# Patient Record
Sex: MALE | Race: WHITE | NOT HISPANIC OR LATINO | URBAN - METROPOLITAN AREA
[De-identification: names, ages, dates, MRNs, and addresses within clinical notes are randomized per-mention and may not be internally consistent; named-entity substitution may affect disease eponyms.]

---

## 2017-03-20 ENCOUNTER — OUTPATIENT (OUTPATIENT)
Dept: OUTPATIENT SERVICES | Facility: HOSPITAL | Age: 42
LOS: 1 days | Discharge: HOME | End: 2017-03-20

## 2017-06-27 DIAGNOSIS — J18.9 PNEUMONIA, UNSPECIFIED ORGANISM: ICD-10-CM

## 2019-04-30 ENCOUNTER — OUTPATIENT (OUTPATIENT)
Dept: OUTPATIENT SERVICES | Facility: HOSPITAL | Age: 44
LOS: 1 days | Discharge: HOME | End: 2019-04-30

## 2019-04-30 DIAGNOSIS — Z00.00 ENCOUNTER FOR GENERAL ADULT MEDICAL EXAMINATION WITHOUT ABNORMAL FINDINGS: ICD-10-CM

## 2020-05-06 ENCOUNTER — TRANSCRIPTION ENCOUNTER (OUTPATIENT)
Age: 45
End: 2020-05-06

## 2021-01-21 ENCOUNTER — INPATIENT (INPATIENT)
Facility: HOSPITAL | Age: 46
LOS: 1 days | Discharge: HOME | End: 2021-01-23
Attending: NEUROLOGICAL SURGERY | Admitting: NEUROLOGICAL SURGERY
Payer: COMMERCIAL

## 2021-01-21 VITALS
HEART RATE: 95 BPM | SYSTOLIC BLOOD PRESSURE: 159 MMHG | HEIGHT: 73 IN | DIASTOLIC BLOOD PRESSURE: 91 MMHG | WEIGHT: 240.08 LBS | TEMPERATURE: 98 F | OXYGEN SATURATION: 97 % | RESPIRATION RATE: 19 BRPM

## 2021-01-21 DIAGNOSIS — Z98.890 OTHER SPECIFIED POSTPROCEDURAL STATES: Chronic | ICD-10-CM

## 2021-01-21 LAB
ALBUMIN SERPL ELPH-MCNC: 4.6 G/DL — SIGNIFICANT CHANGE UP (ref 3.5–5.2)
ALP SERPL-CCNC: 122 U/L — HIGH (ref 30–115)
ALT FLD-CCNC: 26 U/L — SIGNIFICANT CHANGE UP (ref 0–41)
ANION GAP SERPL CALC-SCNC: 13 MMOL/L — SIGNIFICANT CHANGE UP (ref 7–14)
APTT BLD: 39.6 SEC — HIGH (ref 27–39.2)
AST SERPL-CCNC: 18 U/L — SIGNIFICANT CHANGE UP (ref 0–41)
BASOPHILS # BLD AUTO: 0.04 K/UL — SIGNIFICANT CHANGE UP (ref 0–0.2)
BASOPHILS NFR BLD AUTO: 0.5 % — SIGNIFICANT CHANGE UP (ref 0–1)
BILIRUB SERPL-MCNC: 0.4 MG/DL — SIGNIFICANT CHANGE UP (ref 0.2–1.2)
BLD GP AB SCN SERPL QL: SIGNIFICANT CHANGE UP
BUN SERPL-MCNC: 9 MG/DL — LOW (ref 10–20)
CALCIUM SERPL-MCNC: 9.3 MG/DL — SIGNIFICANT CHANGE UP (ref 8.5–10.1)
CHLORIDE SERPL-SCNC: 101 MMOL/L — SIGNIFICANT CHANGE UP (ref 98–110)
CO2 SERPL-SCNC: 24 MMOL/L — SIGNIFICANT CHANGE UP (ref 17–32)
CREAT SERPL-MCNC: 0.8 MG/DL — SIGNIFICANT CHANGE UP (ref 0.7–1.5)
EOSINOPHIL # BLD AUTO: 0.06 K/UL — SIGNIFICANT CHANGE UP (ref 0–0.7)
EOSINOPHIL NFR BLD AUTO: 0.7 % — SIGNIFICANT CHANGE UP (ref 0–8)
GLUCOSE SERPL-MCNC: 149 MG/DL — HIGH (ref 70–99)
HCT VFR BLD CALC: 46.5 % — SIGNIFICANT CHANGE UP (ref 42–52)
HGB BLD-MCNC: 16.2 G/DL — SIGNIFICANT CHANGE UP (ref 14–18)
IMM GRANULOCYTES NFR BLD AUTO: 0.4 % — HIGH (ref 0.1–0.3)
INR BLD: 1.06 RATIO — SIGNIFICANT CHANGE UP (ref 0.65–1.3)
LYMPHOCYTES # BLD AUTO: 1.71 K/UL — SIGNIFICANT CHANGE UP (ref 1.2–3.4)
LYMPHOCYTES # BLD AUTO: 20.2 % — LOW (ref 20.5–51.1)
MCHC RBC-ENTMCNC: 28.7 PG — SIGNIFICANT CHANGE UP (ref 27–31)
MCHC RBC-ENTMCNC: 34.8 G/DL — SIGNIFICANT CHANGE UP (ref 32–37)
MCV RBC AUTO: 82.3 FL — SIGNIFICANT CHANGE UP (ref 80–94)
MONOCYTES # BLD AUTO: 0.36 K/UL — SIGNIFICANT CHANGE UP (ref 0.1–0.6)
MONOCYTES NFR BLD AUTO: 4.3 % — SIGNIFICANT CHANGE UP (ref 1.7–9.3)
NEUTROPHILS # BLD AUTO: 6.26 K/UL — SIGNIFICANT CHANGE UP (ref 1.4–6.5)
NEUTROPHILS NFR BLD AUTO: 73.9 % — SIGNIFICANT CHANGE UP (ref 42.2–75.2)
NRBC # BLD: 0 /100 WBCS — SIGNIFICANT CHANGE UP (ref 0–0)
PLATELET # BLD AUTO: 233 K/UL — SIGNIFICANT CHANGE UP (ref 130–400)
POTASSIUM SERPL-MCNC: 4 MMOL/L — SIGNIFICANT CHANGE UP (ref 3.5–5)
POTASSIUM SERPL-SCNC: 4 MMOL/L — SIGNIFICANT CHANGE UP (ref 3.5–5)
PROT SERPL-MCNC: 7.1 G/DL — SIGNIFICANT CHANGE UP (ref 6–8)
PROTHROM AB SERPL-ACNC: 12.2 SEC — SIGNIFICANT CHANGE UP (ref 9.95–12.87)
RBC # BLD: 5.65 M/UL — SIGNIFICANT CHANGE UP (ref 4.7–6.1)
RBC # FLD: 12.4 % — SIGNIFICANT CHANGE UP (ref 11.5–14.5)
SARS-COV-2 RNA SPEC QL NAA+PROBE: SIGNIFICANT CHANGE UP
SODIUM SERPL-SCNC: 138 MMOL/L — SIGNIFICANT CHANGE UP (ref 135–146)
WBC # BLD: 8.46 K/UL — SIGNIFICANT CHANGE UP (ref 4.8–10.8)
WBC # FLD AUTO: 8.46 K/UL — SIGNIFICANT CHANGE UP (ref 4.8–10.8)

## 2021-01-21 PROCEDURE — 99285 EMERGENCY DEPT VISIT HI MDM: CPT

## 2021-01-21 PROCEDURE — 93970 EXTREMITY STUDY: CPT | Mod: 26

## 2021-01-21 PROCEDURE — 63030 LAMOT DCMPRN NRV RT 1 LMBR: CPT | Mod: AS,RT

## 2021-01-21 RX ORDER — PANTOPRAZOLE SODIUM 20 MG/1
40 TABLET, DELAYED RELEASE ORAL
Refills: 0 | Status: DISCONTINUED | OUTPATIENT
Start: 2021-01-21 | End: 2021-01-23

## 2021-01-21 RX ORDER — GABAPENTIN 400 MG/1
500 CAPSULE ORAL EVERY 8 HOURS
Refills: 0 | Status: DISCONTINUED | OUTPATIENT
Start: 2021-01-21 | End: 2021-01-23

## 2021-01-21 RX ORDER — METHOCARBAMOL 500 MG/1
750 TABLET, FILM COATED ORAL EVERY 6 HOURS
Refills: 0 | Status: DISCONTINUED | OUTPATIENT
Start: 2021-01-21 | End: 2021-01-23

## 2021-01-21 RX ORDER — MORPHINE SULFATE 50 MG/1
6 CAPSULE, EXTENDED RELEASE ORAL ONCE
Refills: 0 | Status: DISCONTINUED | OUTPATIENT
Start: 2021-01-21 | End: 2021-01-21

## 2021-01-21 RX ORDER — SENNA PLUS 8.6 MG/1
2 TABLET ORAL AT BEDTIME
Refills: 0 | Status: DISCONTINUED | OUTPATIENT
Start: 2021-01-21 | End: 2021-01-23

## 2021-01-21 RX ORDER — OXYCODONE AND ACETAMINOPHEN 5; 325 MG/1; MG/1
1 TABLET ORAL EVERY 4 HOURS
Refills: 0 | Status: DISCONTINUED | OUTPATIENT
Start: 2021-01-21 | End: 2021-01-23

## 2021-01-21 RX ORDER — ENOXAPARIN SODIUM 100 MG/ML
40 INJECTION SUBCUTANEOUS DAILY
Refills: 0 | Status: DISCONTINUED | OUTPATIENT
Start: 2021-01-21 | End: 2021-01-22

## 2021-01-21 RX ORDER — OXYCODONE AND ACETAMINOPHEN 5; 325 MG/1; MG/1
2 TABLET ORAL EVERY 4 HOURS
Refills: 0 | Status: DISCONTINUED | OUTPATIENT
Start: 2021-01-21 | End: 2021-01-23

## 2021-01-21 RX ORDER — HYDROMORPHONE HYDROCHLORIDE 2 MG/ML
1 INJECTION INTRAMUSCULAR; INTRAVENOUS; SUBCUTANEOUS EVERY 4 HOURS
Refills: 0 | Status: DISCONTINUED | OUTPATIENT
Start: 2021-01-21 | End: 2021-01-23

## 2021-01-21 RX ORDER — ACETAMINOPHEN 500 MG
650 TABLET ORAL EVERY 6 HOURS
Refills: 0 | Status: DISCONTINUED | OUTPATIENT
Start: 2021-01-21 | End: 2021-01-23

## 2021-01-21 RX ORDER — ONDANSETRON 8 MG/1
4 TABLET, FILM COATED ORAL EVERY 6 HOURS
Refills: 0 | Status: DISCONTINUED | OUTPATIENT
Start: 2021-01-21 | End: 2021-01-23

## 2021-01-21 RX ADMIN — HYDROMORPHONE HYDROCHLORIDE 1 MILLIGRAM(S): 2 INJECTION INTRAMUSCULAR; INTRAVENOUS; SUBCUTANEOUS at 18:09

## 2021-01-21 RX ADMIN — METHOCARBAMOL 750 MILLIGRAM(S): 500 TABLET, FILM COATED ORAL at 23:24

## 2021-01-21 RX ADMIN — OXYCODONE AND ACETAMINOPHEN 1 TABLET(S): 5; 325 TABLET ORAL at 19:22

## 2021-01-21 RX ADMIN — OXYCODONE AND ACETAMINOPHEN 2 TABLET(S): 5; 325 TABLET ORAL at 23:24

## 2021-01-21 RX ADMIN — MORPHINE SULFATE 6 MILLIGRAM(S): 50 CAPSULE, EXTENDED RELEASE ORAL at 15:28

## 2021-01-21 RX ADMIN — GABAPENTIN 500 MILLIGRAM(S): 400 CAPSULE ORAL at 23:24

## 2021-01-21 NOTE — ED PROVIDER NOTE - ATTENDING CONTRIBUTION TO CARE
44yo man w back pain x 1 month s/p shoveling snow now has R foot drop with persistent severe pain despite pain meds, saw neurosurgery and was sent to the ED for admission for OR due to herniated disc. VS, exam as noted, pt nontoxic appearing but uncomfortable due to pain; obvious R foot drop. Seen by neurosurg in the ED will admit for preop and OR.

## 2021-01-21 NOTE — ED ADULT NURSE NOTE - NSIMPLEMENTINTERV_GEN_ALL_ED
Implemented All Fall with Harm Risk Interventions:  [place] to call system. Call bell, personal items and telephone within reach. Instruct patient to call for assistance. Room bathroom lighting operational. Non-slip footwear when patient is off stretcher. Physically safe environment: no spills, clutter or unnecessary equipment. Stretcher in lowest position, wheels locked, appropriate side rails in place. Provide visual cue, wrist band, yellow gown, etc. Monitor gait and stability. Monitor for mental status changes and reorient to person, place, and time. Review medications for side effects contributing to fall risk. Reinforce activity limits and safety measures with patient and family. Provide visual clues: red socks.

## 2021-01-21 NOTE — H&P ADULT - NSHPPHYSICALEXAM_GEN_ALL_CORE
Strength LLE 5/5  RLE 5/5 Proximally, 3/5 DF, 5/5 PF  Sensation decreased to light touch distal/lateral RLE compared to Left, equal proximally  KJ L 2+, R 1+  No tenderness to palpation of lumbar spine

## 2021-01-21 NOTE — H&P ADULT - HISTORY OF PRESENT ILLNESS
45 year old male with h/o of herniated disk in the past as well as lower back pain. Pt sts last month he was shoveling snow with he developed RLE "sciatica" type pain. He did not seek treatment. Two weeks later he turned suddenly and developed numbness in his leg and notice he had dorsiflexion weakness in his right foot. He saw a Neurologist who gave him Prednisone and underwent an MRI of the lumbar spine which showed an L3-4 herniated disk. He underwent EMG as well. He was given Zanaflex as well as Gabapentin. He saw Dr. Cowan in the office today and was sent to the ER for admission. He is c/o moderate LBP with pain radiating into his Right hip as well as pain into his right shin and burning in the dorsum of his foot. Sts Gabapentin did take the edge off. Denies incontinence.  45 year old male with h/o of herniated disk in the past as well as lower back pain. Pt sts last month he was shoveling snow with he developed RLE "sciatica" type pain. He did not seek treatment. He then turned suddenly and developed numbness in his leg. He sts he was walking up stairs when he stubbed his toe and noticed he had dorsiflexion weakness in his right foot. He saw a Neurologist who gave him Prednisone and underwent an MRI of the lumbar spine which showed an L3-4 herniated disk. He underwent EMG as well. He was given Zanaflex as well as Gabapentin. He saw Dr. Cowan in the office today and was sent to the ER for admission. He is c/o moderate LBP with pain radiating into his Right hip as well as pain into his right shin and burning in the dorsum of his foot. Sts Gabapentin did take the edge off. Denies incontinence.

## 2021-01-21 NOTE — ED PROVIDER NOTE - WET READ LAUNCH FT
There are no Wet Read(s) to document. How Severe Is Your Skin Lesion?: mild Has Your Skin Lesion Been Treated?: not been treated Is This A New Presentation, Or A Follow-Up?: Skin Lesion

## 2021-01-21 NOTE — ED PROVIDER NOTE - PHYSICAL EXAMINATION
CONST: Pt appears uncomfortable  EYES: Sclera and conjunctiva clear.   ENT: No nasal discharge. Oropharynx normal appearing  NECK: Non-tender, no meningeal signs. normal ROM. supple   CARD: S1 S2; No jvd  RESP: Equal BS B/L, No wheezes, rhonchi or rales. No distress  GI: Soft, non-tender, non-distended. no cva tenderness. normal BS  MS: Normal ROM in all extremities. pulses 2 +. no calf tenderness or swelling  SKIN: Warm, dry, no acute rashes. Good turgor  NEURO: RLE DTR 1+, decreased sensation. A&Ox3, Strength 5/5.

## 2021-01-21 NOTE — H&P ADULT - ATTENDING COMMENTS
Pt known to me from office, presented with acute foot drop x ~4 weeks.     Admitted for preop workup/clearance, with plans for surgery during this admission.

## 2021-01-21 NOTE — ED ADULT NURSE NOTE - OBJECTIVE STATEMENT
Patient presents to ED with complaints of back and right leg/foot pain. Patient dent by Dr. Cowan for admission as patient had pinched nerve causing right foot drop. Right leg and foot cool to touch with mild swelling. Pulses present.

## 2021-01-21 NOTE — ED PROVIDER NOTE - NS ED ROS FT
Constitutional: (-) fever  Eyes/ENT: (-) blurry vision, (-) epistaxis  Cardiovascular: (-) chest pain, (-) syncope  Respiratory: (-) cough, (-) shortness of breath  Gastrointestinal: (-) vomiting, (-) diarrhea  : (-) incontinence, (-) dysuria  Musculoskeletal: (+) back pain, (-) neck pain, (-) joint pain  Integumentary: (-) rash, (-) edema  Neurological: (+) weakness, (+) numbness  Allergic/Immunologic: (-) pruritus

## 2021-01-21 NOTE — H&P ADULT - NSHPLABSRESULTS_GEN_ALL_CORE
Comprehensive Metabolic Panel (01.21.21 @ 15:25)   Sodium, Serum: 138 mmol/L   Potassium, Serum: 4.0 mmol/L   Chloride, Serum: 101 mmol/L   Carbon Dioxide, Serum: 24 mmol/L   Anion Gap, Serum: 13 mmol/L   Blood Urea Nitrogen, Serum: 9 mg/dL   Creatinine, Serum: 0.8 mg/dL   Glucose, Serum: 149 mg/dL   Calcium, Total Serum: 9.3 mg/dL   Protein Total, Serum: 7.1 g/dL   Albumin, Serum: 4.6 g/dL   Bilirubin Total, Serum: 0.4 mg/dL   Alkaline Phosphatase, Serum: 122 U/L   Aspartate Aminotransferase (AST/SGOT): 18 U/L   Alanine Aminotransferase (ALT/SGPT): 26 U/L   eGFR if Non : 109: Interpretative comment   The units for eGFR are mL/min/1.73M2 (normalized body surface area). The   eGFR is calculated from a serum creatinine using the CKD-EPI equation.   Other variables required for calculation are race, age and sex. Among   patients with chronic kidney disease (CKD), the eGFR is useful in   determining the stage of disease according to KDOQI CKD classification.   All eGFR results are reported numerically with the following   interpretation.   GFR With Without   (ml/min/1.73 m2) Kidney Damage Kidney Damage   >= 90 Stage 1 Normal   60-89 Stage 2 Decreased GFR   30-59 Stage 3 Stage 3   15-29 Stage 4 Stage 4   < 15 Stage 5 Stage 5   Each stage of CKD assumes that the associated GFR level has been in   effect for at least 3 months. Determination of stages one and two (with   eGFR > 59 ml/min/m2) requires estimation of kidney damage for at least 3   months as defined by structural or functional abnormalities.   Limitations: All estimates of GFR will be less accurate for patients at   extremes of muscle mass (including but not limited to frail elderly,   critically ill, or cancer patients), those with unusual diets, and those   with conditions associated with reduced secretion or extrarenal   elimination of creatinine. The eGFR equation is not recommended for use   in patients with unstable creatinine levels. mL/min/1.73M2   eGFR if African American: 126 mL/min/1.73M2 Complete Blood Count + Automated Diff (01.21.21 @ 15:25)   WBC Count: 8.46 K/uL   RBC Count: 5.65 M/uL   Hemoglobin: 16.2 g/dL   Hematocrit: 46.5 %   Mean Cell Volume: 82.3 fL   Mean Cell Hemoglobin: 28.7 pg   Mean Cell Hemoglobin Conc: 34.8 g/dL   Red Cell Distrib Width: 12.4 %   Platelet Count - Automated: 233 K/uL   Auto Neutrophil #: 6.26 K/uL   Auto Lymphocyte #: 1.71 K/uL   Auto Monocyte #: 0.36 K/uL   Auto Eosinophil #: 0.06 K/uL   Auto Basophil #: 0.04 K/uL   Auto Neutrophil %: 73.9: Differential percentages must be correlated with absolute numbers for   clinical significance. %   Auto Lymphocyte %: 20.2 %   Auto Monocyte %: 4.3 %   Auto Eosinophil %: 0.7 %   Auto Basophil %: 0.5 %   Auto Immature Granulocyte %: 0.4 %   Nucleated RBC: 0 /100 WBCs Prothrombin Time and INR, Plasma in AM (01.21.21 @ 15:25)   Prothrombin Time, Plasma: 12.20 sec   INR: 1.06: Recommended ranges for therapeutic INR:   2.0-3.0 for most medical and surgical thromboembolic states   2.0-3.0 for atrial fibrillation   2.0-3.0 for bileaflet mechanical valve in aortic position   2.5-3.5 for mechanical heart valves   Chest 2004;126:[id number]   The presence of direct thrombin inhibitors (argatroban, refludan)   may falsely increase results. ratio Activated Partial Thromboplastin Time in AM (01.21.21 @ 15:25)   Activated Partial Thromboplastin Time: 39.6 sec

## 2021-01-22 PROCEDURE — 93306 TTE W/DOPPLER COMPLETE: CPT | Mod: 26

## 2021-01-22 RX ADMIN — METHOCARBAMOL 750 MILLIGRAM(S): 500 TABLET, FILM COATED ORAL at 04:30

## 2021-01-22 RX ADMIN — METHOCARBAMOL 750 MILLIGRAM(S): 500 TABLET, FILM COATED ORAL at 17:29

## 2021-01-22 RX ADMIN — GABAPENTIN 500 MILLIGRAM(S): 400 CAPSULE ORAL at 14:21

## 2021-01-22 RX ADMIN — METHOCARBAMOL 750 MILLIGRAM(S): 500 TABLET, FILM COATED ORAL at 11:50

## 2021-01-22 RX ADMIN — SENNA PLUS 2 TABLET(S): 8.6 TABLET ORAL at 17:29

## 2021-01-22 RX ADMIN — GABAPENTIN 500 MILLIGRAM(S): 400 CAPSULE ORAL at 04:31

## 2021-01-22 RX ADMIN — OXYCODONE AND ACETAMINOPHEN 2 TABLET(S): 5; 325 TABLET ORAL at 14:21

## 2021-01-22 RX ADMIN — HYDROMORPHONE HYDROCHLORIDE 1 MILLIGRAM(S): 2 INJECTION INTRAMUSCULAR; INTRAVENOUS; SUBCUTANEOUS at 04:32

## 2021-01-22 RX ADMIN — GABAPENTIN 500 MILLIGRAM(S): 400 CAPSULE ORAL at 20:42

## 2021-01-22 RX ADMIN — HYDROMORPHONE HYDROCHLORIDE 1 MILLIGRAM(S): 2 INJECTION INTRAMUSCULAR; INTRAVENOUS; SUBCUTANEOUS at 20:41

## 2021-01-22 RX ADMIN — METHOCARBAMOL 750 MILLIGRAM(S): 500 TABLET, FILM COATED ORAL at 22:07

## 2021-01-22 RX ADMIN — OXYCODONE AND ACETAMINOPHEN 2 TABLET(S): 5; 325 TABLET ORAL at 22:07

## 2021-01-22 RX ADMIN — PANTOPRAZOLE SODIUM 40 MILLIGRAM(S): 20 TABLET, DELAYED RELEASE ORAL at 05:08

## 2021-01-22 RX ADMIN — HYDROMORPHONE HYDROCHLORIDE 1 MILLIGRAM(S): 2 INJECTION INTRAMUSCULAR; INTRAVENOUS; SUBCUTANEOUS at 11:50

## 2021-01-22 NOTE — PROGRESS NOTE ADULT - SUBJECTIVE AND OBJECTIVE BOX
Neurosurgery Progress Note    Pt seen and examined at the bedside in 4C.  No major events over night.  Currently the pt is laying in bed, in NAD and hemodynamically stable.  The pt continues to endorse hyperesthesia/pain to the Right lower extremities about the anterior shin through the medial aspect of the foot, which is constant however intermittently relieved by oral gabapentin.  In addition the Pt continues to display inability to plantarflex the right foot.    Subjective: 45yMale with a pmhx of LUMBAR HERNIATED DISC FOOT DROP    ^BACK SURGURY    MEWS Score    No pertinent past medical history    Lumbar herniated disc    H/O shoulder surgery    BACK SURGURY    90+    Foot drop    SysAdmin_VisitLink        Allergies    Levaquin (Rash)    Intolerances        Vital Signs Last 24 Hrs  T(C): 36.3 (22 Jan 2021 04:45), Max: 36.9 (21 Jan 2021 15:07)  T(F): 97.4 (22 Jan 2021 04:45), Max: 98.5 (21 Jan 2021 15:07)  HR: 82 (22 Jan 2021 04:45) (80 - 95)  BP: 155/99 (22 Jan 2021 04:45) (144/97 - 159/91)  BP(mean): --  RR: 18 (22 Jan 2021 04:45) (16 - 19)  SpO2: 97% (21 Jan 2021 18:00) (97% - 97%)      acetaminophen   Tablet .. 650 milliGRAM(s) Oral every 6 hours PRN  enoxaparin Injectable 40 milliGRAM(s) SubCutaneous daily  gabapentin 500 milliGRAM(s) Oral every 8 hours  HYDROmorphone  Injectable 1 milliGRAM(s) IV Push every 4 hours PRN  methocarbamol 750 milliGRAM(s) Oral every 6 hours  ondansetron Injectable 4 milliGRAM(s) IV Push every 6 hours PRN  oxycodone    5 mG/acetaminophen 325 mG 1 Tablet(s) Oral every 4 hours PRN  oxycodone    5 mG/acetaminophen 325 mG 2 Tablet(s) Oral every 4 hours PRN  pantoprazole    Tablet 40 milliGRAM(s) Oral before breakfast  senna 2 Tablet(s) Oral at bedtime PRN          REVIEW OF SYSTEMS    [ ] A ten-point review of systems was otherwise negative except as noted.  [ ] Due to altered mental status/intubation, subjective information were not able to be obtained from the patient. History was obtained, to the extent possible, from review of the chart and collateral sources of information.      Exam:  -AAOX3.  -EOMI  -Motor: 1/5 R foot plantarflexion, 5/5 R dorsiflexion  5/5 power in b/l UE  -Sensation: intact to light touch in all extremities        CBC Full  -  ( 21 Jan 2021 15:25 )  WBC Count : 8.46 K/uL  RBC Count : 5.65 M/uL  Hemoglobin : 16.2 g/dL  Hematocrit : 46.5 %  Platelet Count - Automated : 233 K/uL  Mean Cell Volume : 82.3 fL  Mean Cell Hemoglobin : 28.7 pg  Mean Cell Hemoglobin Concentration : 34.8 g/dL  Auto Neutrophil # : 6.26 K/uL  Auto Lymphocyte # : 1.71 K/uL  Auto Monocyte # : 0.36 K/uL  Auto Eosinophil # : 0.06 K/uL  Auto Basophil # : 0.04 K/uL  Auto Neutrophil % : 73.9 %  Auto Lymphocyte % : 20.2 %  Auto Monocyte % : 4.3 %  Auto Eosinophil % : 0.7 %  Auto Basophil % : 0.5 %    01-21    138  |  101  |  9<L>  ----------------------------<  149<H>  4.0   |  24  |  0.8    Ca    9.3      21 Jan 2021 15:25    TPro  7.1  /  Alb  4.6  /  TBili  0.4  /  DBili  x   /  AST  18  /  ALT  26  /  AlkPhos  122<H>  01-21    PT/INR - ( 21 Jan 2021 15:25 )   PT: 12.20 sec;   INR: 1.06 ratio         PTT - ( 21 Jan 2021 15:25 )  PTT:39.6 sec            Assessment/Plan:   This is a 45 year old gentleman with hyperesthesia/pain to the Right lower extremities about the anterior shin through the medial aspect of the foot, which is constant however intermittently relieved by oral gabapentin, the Pt continues to display inability to plantarflex the right foot.  Out pt work up of MRI of the lumbar spine revealing a L3-4 herniated disk.       -Anticipated for the OR tomorrow  -Pain management  -NPO after midnight  -Hold Lovenox  -PT rehab/ encourage incentive spirometry   -DVT prophylaxis: b/l sequentials       Neurosurgery Progress Note    Pt seen and examined at the bedside in 4C.  No major events over night.  Currently the pt is laying in bed, in NAD and hemodynamically stable.  The pt continues to endorse hyperesthesia/pain to the Right lower extremities about the anterior shin through the medial aspect of the foot, which is constant however intermittently relieved by oral gabapentin.  In addition the Pt continues to display inability to plantarflex the right foot.    Subjective: 45yMale with a pmhx of LUMBAR HERNIATED DISC FOOT DROP    ^BACK SURGURY    MEWS Score    No pertinent past medical history    Lumbar herniated disc    H/O shoulder surgery    BACK SURGURY    90+    Foot drop    SysAdmin_VisitLink        Allergies    Levaquin (Rash)    Intolerances        Vital Signs Last 24 Hrs  T(C): 36.3 (22 Jan 2021 04:45), Max: 36.9 (21 Jan 2021 15:07)  T(F): 97.4 (22 Jan 2021 04:45), Max: 98.5 (21 Jan 2021 15:07)  HR: 82 (22 Jan 2021 04:45) (80 - 95)  BP: 155/99 (22 Jan 2021 04:45) (144/97 - 159/91)  BP(mean): --  RR: 18 (22 Jan 2021 04:45) (16 - 19)  SpO2: 97% (21 Jan 2021 18:00) (97% - 97%)      acetaminophen   Tablet .. 650 milliGRAM(s) Oral every 6 hours PRN  enoxaparin Injectable 40 milliGRAM(s) SubCutaneous daily  gabapentin 500 milliGRAM(s) Oral every 8 hours  HYDROmorphone  Injectable 1 milliGRAM(s) IV Push every 4 hours PRN  methocarbamol 750 milliGRAM(s) Oral every 6 hours  ondansetron Injectable 4 milliGRAM(s) IV Push every 6 hours PRN  oxycodone    5 mG/acetaminophen 325 mG 1 Tablet(s) Oral every 4 hours PRN  oxycodone    5 mG/acetaminophen 325 mG 2 Tablet(s) Oral every 4 hours PRN  pantoprazole    Tablet 40 milliGRAM(s) Oral before breakfast  senna 2 Tablet(s) Oral at bedtime PRN          REVIEW OF SYSTEMS    [x ] A ten-point review of systems was otherwise negative except as noted.  [ ] Due to altered mental status/intubation, subjective information were not able to be obtained from the patient. History was obtained, to the extent possible, from review of the chart and collateral sources of information.      Exam:  -AAOX3.  -EOMI  -Motor: 1/5 R foot plantarflexion, 5/5 R dorsiflexion  5/5 power in b/l UE  -Sensation: intact to light touch in all extremities        CBC Full  -  ( 21 Jan 2021 15:25 )  WBC Count : 8.46 K/uL  RBC Count : 5.65 M/uL  Hemoglobin : 16.2 g/dL  Hematocrit : 46.5 %  Platelet Count - Automated : 233 K/uL  Mean Cell Volume : 82.3 fL  Mean Cell Hemoglobin : 28.7 pg  Mean Cell Hemoglobin Concentration : 34.8 g/dL  Auto Neutrophil # : 6.26 K/uL  Auto Lymphocyte # : 1.71 K/uL  Auto Monocyte # : 0.36 K/uL  Auto Eosinophil # : 0.06 K/uL  Auto Basophil # : 0.04 K/uL  Auto Neutrophil % : 73.9 %  Auto Lymphocyte % : 20.2 %  Auto Monocyte % : 4.3 %  Auto Eosinophil % : 0.7 %  Auto Basophil % : 0.5 %    01-21    138  |  101  |  9<L>  ----------------------------<  149<H>  4.0   |  24  |  0.8    Ca    9.3      21 Jan 2021 15:25    TPro  7.1  /  Alb  4.6  /  TBili  0.4  /  DBili  x   /  AST  18  /  ALT  26  /  AlkPhos  122<H>  01-21    PT/INR - ( 21 Jan 2021 15:25 )   PT: 12.20 sec;   INR: 1.06 ratio         PTT - ( 21 Jan 2021 15:25 )  PTT:39.6 sec            Assessment/Plan:   This is a 45 year old gentleman with hyperesthesia/pain to the Right lower extremities about the anterior shin through the medial aspect of the foot, which is constant however intermittently relieved by oral gabapentin, the Pt continues to display inability to plantarflex the right foot.  Out pt work up of MRI of the lumbar spine revealing a L3-4 herniated disk.       -Anticipated for the OR tomorrow  -Pain management  -NPO after midnight  -Hold Lovenox  -PT rehab/ encourage incentive spirometry   -DVT prophylaxis: b/l sequentials

## 2021-01-23 ENCOUNTER — TRANSCRIPTION ENCOUNTER (OUTPATIENT)
Age: 46
End: 2021-01-23

## 2021-01-23 VITALS
SYSTOLIC BLOOD PRESSURE: 126 MMHG | TEMPERATURE: 98 F | DIASTOLIC BLOOD PRESSURE: 78 MMHG | HEART RATE: 101 BPM | RESPIRATION RATE: 18 BRPM

## 2021-01-23 PROCEDURE — 88304 TISSUE EXAM BY PATHOLOGIST: CPT | Mod: 26

## 2021-01-23 PROCEDURE — 88311 DECALCIFY TISSUE: CPT | Mod: 26

## 2021-01-23 RX ORDER — ONDANSETRON 8 MG/1
4 TABLET, FILM COATED ORAL EVERY 6 HOURS
Refills: 0 | Status: DISCONTINUED | OUTPATIENT
Start: 2021-01-23 | End: 2021-01-23

## 2021-01-23 RX ORDER — SENNA PLUS 8.6 MG/1
2 TABLET ORAL
Qty: 0 | Refills: 0 | DISCHARGE
Start: 2021-01-23

## 2021-01-23 RX ORDER — MEPERIDINE HYDROCHLORIDE 50 MG/ML
12.5 INJECTION INTRAMUSCULAR; INTRAVENOUS; SUBCUTANEOUS
Refills: 0 | Status: DISCONTINUED | OUTPATIENT
Start: 2021-01-23 | End: 2021-01-23

## 2021-01-23 RX ORDER — OXYCODONE AND ACETAMINOPHEN 5; 325 MG/1; MG/1
2 TABLET ORAL EVERY 4 HOURS
Refills: 0 | Status: DISCONTINUED | OUTPATIENT
Start: 2021-01-23 | End: 2021-01-23

## 2021-01-23 RX ORDER — SODIUM CHLORIDE 9 MG/ML
1000 INJECTION, SOLUTION INTRAVENOUS
Refills: 0 | Status: DISCONTINUED | OUTPATIENT
Start: 2021-01-23 | End: 2021-01-23

## 2021-01-23 RX ORDER — SENNA PLUS 8.6 MG/1
2 TABLET ORAL AT BEDTIME
Refills: 0 | Status: DISCONTINUED | OUTPATIENT
Start: 2021-01-23 | End: 2021-01-23

## 2021-01-23 RX ORDER — CEFAZOLIN SODIUM 1 G
1000 VIAL (EA) INJECTION EVERY 8 HOURS
Refills: 0 | Status: DISCONTINUED | OUTPATIENT
Start: 2021-01-23 | End: 2021-01-23

## 2021-01-23 RX ORDER — HYDROMORPHONE HYDROCHLORIDE 2 MG/ML
1 INJECTION INTRAMUSCULAR; INTRAVENOUS; SUBCUTANEOUS
Refills: 0 | Status: DISCONTINUED | OUTPATIENT
Start: 2021-01-23 | End: 2021-01-23

## 2021-01-23 RX ORDER — ACETAMINOPHEN 500 MG
650 TABLET ORAL EVERY 6 HOURS
Refills: 0 | Status: DISCONTINUED | OUTPATIENT
Start: 2021-01-23 | End: 2021-01-23

## 2021-01-23 RX ORDER — GABAPENTIN 400 MG/1
1 CAPSULE ORAL
Qty: 90 | Refills: 0
Start: 2021-01-23

## 2021-01-23 RX ORDER — GABAPENTIN 400 MG/1
500 CAPSULE ORAL EVERY 8 HOURS
Refills: 0 | Status: DISCONTINUED | OUTPATIENT
Start: 2021-01-23 | End: 2021-01-23

## 2021-01-23 RX ORDER — ACETAMINOPHEN 500 MG
2 TABLET ORAL
Qty: 0 | Refills: 0 | DISCHARGE
Start: 2021-01-23

## 2021-01-23 RX ORDER — ONDANSETRON 8 MG/1
4 TABLET, FILM COATED ORAL ONCE
Refills: 0 | Status: DISCONTINUED | OUTPATIENT
Start: 2021-01-23 | End: 2021-01-23

## 2021-01-23 RX ORDER — OXYCODONE AND ACETAMINOPHEN 5; 325 MG/1; MG/1
1 TABLET ORAL EVERY 4 HOURS
Refills: 0 | Status: DISCONTINUED | OUTPATIENT
Start: 2021-01-23 | End: 2021-01-23

## 2021-01-23 RX ORDER — TIZANIDINE 4 MG/1
2 TABLET ORAL
Qty: 0 | Refills: 0 | DISCHARGE

## 2021-01-23 RX ORDER — PANTOPRAZOLE SODIUM 20 MG/1
40 TABLET, DELAYED RELEASE ORAL
Refills: 0 | Status: DISCONTINUED | OUTPATIENT
Start: 2021-01-23 | End: 2021-01-23

## 2021-01-23 RX ORDER — GABAPENTIN 400 MG/1
1500 CAPSULE ORAL
Qty: 0 | Refills: 0 | DISCHARGE

## 2021-01-23 RX ORDER — METHOCARBAMOL 500 MG/1
1 TABLET, FILM COATED ORAL
Qty: 30 | Refills: 0
Start: 2021-01-23

## 2021-01-23 RX ORDER — METHOCARBAMOL 500 MG/1
750 TABLET, FILM COATED ORAL EVERY 6 HOURS
Refills: 0 | Status: DISCONTINUED | OUTPATIENT
Start: 2021-01-23 | End: 2021-01-23

## 2021-01-23 RX ORDER — HYDROMORPHONE HYDROCHLORIDE 2 MG/ML
0.5 INJECTION INTRAMUSCULAR; INTRAVENOUS; SUBCUTANEOUS
Refills: 0 | Status: DISCONTINUED | OUTPATIENT
Start: 2021-01-23 | End: 2021-01-23

## 2021-01-23 RX ORDER — HYDROMORPHONE HYDROCHLORIDE 2 MG/ML
1 INJECTION INTRAMUSCULAR; INTRAVENOUS; SUBCUTANEOUS EVERY 4 HOURS
Refills: 0 | Status: DISCONTINUED | OUTPATIENT
Start: 2021-01-23 | End: 2021-01-23

## 2021-01-23 RX ADMIN — METHOCARBAMOL 750 MILLIGRAM(S): 500 TABLET, FILM COATED ORAL at 13:31

## 2021-01-23 RX ADMIN — METHOCARBAMOL 750 MILLIGRAM(S): 500 TABLET, FILM COATED ORAL at 05:28

## 2021-01-23 RX ADMIN — HYDROMORPHONE HYDROCHLORIDE 1 MILLIGRAM(S): 2 INJECTION INTRAMUSCULAR; INTRAVENOUS; SUBCUTANEOUS at 11:37

## 2021-01-23 RX ADMIN — METHOCARBAMOL 750 MILLIGRAM(S): 500 TABLET, FILM COATED ORAL at 16:53

## 2021-01-23 RX ADMIN — OXYCODONE AND ACETAMINOPHEN 2 TABLET(S): 5; 325 TABLET ORAL at 13:31

## 2021-01-23 RX ADMIN — SODIUM CHLORIDE 125 MILLILITER(S): 9 INJECTION, SOLUTION INTRAVENOUS at 11:58

## 2021-01-23 RX ADMIN — Medication 100 MILLIGRAM(S): at 14:43

## 2021-01-23 RX ADMIN — GABAPENTIN 500 MILLIGRAM(S): 400 CAPSULE ORAL at 13:33

## 2021-01-23 RX ADMIN — PANTOPRAZOLE SODIUM 40 MILLIGRAM(S): 20 TABLET, DELAYED RELEASE ORAL at 05:28

## 2021-01-23 RX ADMIN — GABAPENTIN 500 MILLIGRAM(S): 400 CAPSULE ORAL at 05:28

## 2021-01-23 RX ADMIN — OXYCODONE AND ACETAMINOPHEN 2 TABLET(S): 5; 325 TABLET ORAL at 05:28

## 2021-01-23 RX ADMIN — OXYCODONE AND ACETAMINOPHEN 2 TABLET(S): 5; 325 TABLET ORAL at 17:34

## 2021-01-23 NOTE — PRE-OP CHECKLIST - IDENTIFICATION BAND VERIFIED
INR= 2.3. Patient instructed to take Coumadin 4 mg on MW, 3 mg on all other days. INR on 2/1/17- fingerstick via [facility]. Patient verbalized understanding of dosing instructions with no further questions.    done

## 2021-01-23 NOTE — DISCHARGE NOTE PROVIDER - NSDCCPCAREPLAN_GEN_ALL_CORE_FT
PRINCIPAL DISCHARGE DIAGNOSIS  Diagnosis: Lumbar herniated disc  Assessment and Plan of Treatment:       SECONDARY DISCHARGE DIAGNOSES  Diagnosis: Foot drop  Assessment and Plan of Treatment:

## 2021-01-23 NOTE — DISCHARGE NOTE PROVIDER - NSDCFUADDINST_GEN_ALL_CORE_FT
Keep incision clean and dry. Daily dressing changes starting monday x 3 days. Then leave open to air or cover for comfort. May shower with a dressing and may get incision wet in 5 days

## 2021-01-23 NOTE — CHART NOTE - NSCHARTNOTEFT_GEN_A_CORE
Anesthesia Post Op Assessment  		(    ) Intubated           TV _____	Rate sv____	FiO2__4lnc___  		(x   ) Patent airway. Full return of protective reflexes  		(  x  )Full recovery from anesthesia/sedation to baseline status      Cardiovascular Function:  		BP:	138/91	                  Pulse:		92                  RR:		12                  Temp:		99                  O2Sat:   99      Mental Status:  	        (  x  ) awake		  (  x ) alert		 (    ) drowsy	               (    ) sedated      Nausea/Vomiting:  		(    ) Yes, See post-op orders		   (  x  ) No      Pain Scale: (0-10):			Treatment:     (    ) None	            ( x  ) See Post-Op/PCA Orders      Post-operative Fluids: 	   (    ) Oral	          (  x  ) See post-op Orders        Comments:    Uneventful. No complications from anesthesia.  Discharge when criteria met.

## 2021-01-23 NOTE — DISCHARGE NOTE PROVIDER - NSDCMRMEDTOKEN_GEN_ALL_CORE_FT
acetaminophen 325 mg oral tablet: 2 tab(s) orally every 6 hours, As needed, Temp greater or equal to 38.5C (101.3F)  gabapentin 300 mg oral capsule: 1 cap(s) orally every 8 hours   methocarbamol 750 mg oral tablet: 1 tab(s) orally every 6 hours, As Needed -for muscle spasm   oxycodone-acetaminophen 5 mg-325 mg oral tablet: 1 tab(s) orally every 4 hours, As Needed for moderate pain MDD:8  senna oral tablet: 2 tab(s) orally once a day (at bedtime), As needed, Constipation

## 2021-01-23 NOTE — PHYSICAL THERAPY INITIAL EVALUATION ADULT - GENERAL OBSERVATIONS, REHAB EVAL
13:35-14:00 pt encountered in bed, spouse present.  He was independent in bed mobility and ambulated and climbed stairs with supervision.  He has a right foot drop and would benefit from OUTPATIENT PT.

## 2021-01-23 NOTE — BRIEF OPERATIVE NOTE - NSICDXBRIEFPREOP_GEN_ALL_CORE_FT
PRE-OP DIAGNOSIS:  Lumbar disc herniation with radiculopathy 23-Jan-2021 11:40:26 right L3-4 Keira Cowan

## 2021-01-23 NOTE — DISCHARGE NOTE PROVIDER - CARE PROVIDER_API CALL
Keira Cowan)  [facility]  [address] [place] [address]  Phone: [phone]  Fax: [phone]  Follow Up Time: 2 weeks

## 2021-01-23 NOTE — DISCHARGE NOTE NURSING/CASE MANAGEMENT/SOCIAL WORK - PATIENT PORTAL LINK FT
You can access the FollowMyHealth Patient Portal offered by [facility] by registering at the following website: [email or web address]. By joining [employer]’s FollowMyHealth portal, you will also be able to view your health information using other applications (apps) compatible with our system.

## 2021-01-23 NOTE — BRIEF OPERATIVE NOTE - NSICDXBRIEFPOSTOP_GEN_ALL_CORE_FT
POST-OP DIAGNOSIS:  Lumbar disc herniation with radiculopathy 23-Jan-2021 11:40:43 right L3-4 Keira Cowan

## 2021-01-23 NOTE — DISCHARGE NOTE PROVIDER - HOSPITAL COURSE
45 year old male with h/o of herniated disk in the past as well as lower back pain. Pt sts last month he was shoveling snow with he developed RLE "sciatica" type pain. He did not seek treatment. He then turned suddenly and developed numbness in his leg. He sts he was walking up stairs when he stubbed his toe and noticed he had dorsiflexion weakness in his right foot. He saw a Neurologist who gave him Prednisone and underwent an MRI of the lumbar spine which showed an L3-4 herniated disk. He underwent EMG as well. He was given Zanaflex as well as Gabapentin. He saw Dr. Cowan in the office today and was sent to the ER for admission. He is c/o moderate LBP with pain radiating into his Right hip as well as pain into his right shin and burning in the dorsum of his foot. Sts Gabapentin did take the edge off. Denies incontinence. He had a venous doppler that was negative. He had an echo which showed an EF of 55-65%. On 1.23.21 he was taken to the OR where he underwent a Right L3-4 Laminectomy/Diskectomy. He did well postop with improvement in his pre-op RLE pain and numbness. He ambulated with physical therapy and did well. He was given a prescription for an ankle-foot orthotic. He was discharged home on 1.23.21

## 2021-01-26 LAB — SURGICAL PATHOLOGY STUDY: SIGNIFICANT CHANGE UP

## 2021-01-27 DIAGNOSIS — Z88.1 ALLERGY STATUS TO OTHER ANTIBIOTIC AGENTS STATUS: ICD-10-CM

## 2021-01-27 DIAGNOSIS — M21.371 FOOT DROP, RIGHT FOOT: ICD-10-CM

## 2021-01-27 DIAGNOSIS — M51.16 INTERVERTEBRAL DISC DISORDERS WITH RADICULOPATHY, LUMBAR REGION: ICD-10-CM

## 2021-08-17 ENCOUNTER — TRANSCRIPTION ENCOUNTER (OUTPATIENT)
Age: 46
End: 2021-08-17

## 2022-02-24 PROBLEM — Z78.9 OTHER SPECIFIED HEALTH STATUS: Chronic | Status: ACTIVE | Noted: 2021-01-21

## 2022-02-25 PROBLEM — Z00.00 ENCOUNTER FOR PREVENTIVE HEALTH EXAMINATION: Status: ACTIVE | Noted: 2022-02-25

## 2022-02-28 ENCOUNTER — OUTPATIENT (OUTPATIENT)
Dept: OUTPATIENT SERVICES | Facility: HOSPITAL | Age: 47
LOS: 1 days | Discharge: HOME | End: 2022-02-28
Payer: OTHER MISCELLANEOUS

## 2022-02-28 DIAGNOSIS — M25.531 PAIN IN RIGHT WRIST: ICD-10-CM

## 2022-02-28 DIAGNOSIS — Z98.890 OTHER SPECIFIED POSTPROCEDURAL STATES: Chronic | ICD-10-CM

## 2022-02-28 PROCEDURE — 73200 CT UPPER EXTREMITY W/O DYE: CPT | Mod: 26,RT

## 2022-06-16 ENCOUNTER — NON-APPOINTMENT (OUTPATIENT)
Age: 47
End: 2022-06-16

## 2022-06-19 ENCOUNTER — NON-APPOINTMENT (OUTPATIENT)
Age: 47
End: 2022-06-19

## 2022-11-20 ENCOUNTER — NON-APPOINTMENT (OUTPATIENT)
Age: 47
End: 2022-11-20

## 2023-10-11 NOTE — ED ADULT TRIAGE NOTE - CCCP TRG CHIEF CMPLNT
amLODIPine 2.5 mg oral tablet: 1 tab(s) orally once a day as needed for SBP &gt;120  atorvastatin 10 mg oral tablet: 1 tab(s) orally once a day (at bedtime)  cephalexin 250 mg oral tablet: 1 tab(s) orally every 12 hours  clopidogrel 75 mg oral tablet: 1 tab(s) orally once a day  diazePAM 5 mg oral tablet: 1 tab(s) orally 2 times a day  famotidine 20 mg oral tablet: 1 tab(s) orally once a day  QUEtiapine 25 mg oral tablet: 1 tab(s) orally once a day  sertraline 100 mg oral tablet: 1 tab(s) orally 2 times a day  Vitamin D3 25 mcg (1000 intl units) oral tablet: 1 tab(s) orally once a day   back pain general

## 2024-04-06 ENCOUNTER — APPOINTMENT (OUTPATIENT)
Dept: ORTHOPEDIC SURGERY | Facility: CLINIC | Age: 49
End: 2024-04-06
Payer: OTHER MISCELLANEOUS

## 2024-04-06 DIAGNOSIS — S39.012A STRAIN OF MUSCLE, FASCIA AND TENDON OF LOWER BACK, INITIAL ENCOUNTER: ICD-10-CM

## 2024-04-06 DIAGNOSIS — S80.12XA CONTUSION OF LEFT KNEE, INITIAL ENCOUNTER: ICD-10-CM

## 2024-04-06 DIAGNOSIS — S80.02XA CONTUSION OF LEFT KNEE, INITIAL ENCOUNTER: ICD-10-CM

## 2024-04-06 PROCEDURE — 99213 OFFICE O/P EST LOW 20 MIN: CPT

## 2024-04-06 NOTE — HISTORY OF PRESENT ILLNESS
[de-identified] : 49-year-old  assigned to [employer] [place] pulling out apart from under a truck strained his back and fell on his left knee history of a surgery by  2 years ago February 2022 went very well now is having some pain in the back area Allergy to Levaquin just on valsartan went to [facility] ER x-rays left knee and CT back done nothing specific reported did have some elevation in blood pressure does not smoke

## 2024-04-06 NOTE — IMAGING
[de-identified] : Pleasant easy to examine some discomfort good posterior some spasm in the back healed incision limits in motion tight dorsolumbar fascia and hamstrings negative straight leg bilaterally reflexes depressed but symmetric normal gait left knee good motion mild tenderness over the patella some bruising and healing abrasion

## 2024-04-06 NOTE — ASSESSMENT
[FreeTextEntry1] : Deferred on any diagnostics today we will track down the CT scan he Advil start some therapy no work I will see him back in a couple weeks

## 2024-04-11 ENCOUNTER — APPOINTMENT (OUTPATIENT)
Dept: CARDIOLOGY | Facility: CLINIC | Age: 49
End: 2024-04-11
Payer: COMMERCIAL

## 2024-04-11 VITALS
OXYGEN SATURATION: 97 % | HEIGHT: 72 IN | HEART RATE: 90 BPM | BODY MASS INDEX: 35.21 KG/M2 | WEIGHT: 260 LBS | DIASTOLIC BLOOD PRESSURE: 80 MMHG | SYSTOLIC BLOOD PRESSURE: 118 MMHG

## 2024-04-11 DIAGNOSIS — R07.9 CHEST PAIN, UNSPECIFIED: ICD-10-CM

## 2024-04-11 PROCEDURE — 99204 OFFICE O/P NEW MOD 45 MIN: CPT

## 2024-04-12 NOTE — HISTORY OF PRESENT ILLNESS
[FreeTextEntry1] : TAVO YODER is a 49-year-old male, with a PMHx significant for HTN, who presents today for evaluation of HTN associated with left arm pain. Patient reports he works as a  and noticed his BP has been elevated. States that when his BP is elevated, he develops left arm pain and numbness. Worse with exertion and relieved with rest. Otherwise: (-) SOB.   Social History: (-) Smoking, (+) EtOH, (-) Illicit drug use.

## 2024-04-12 NOTE — REVIEW OF SYSTEMS
[SOB] : no shortness of breath [Dyspnea on exertion] : not dyspnea during exertion [Lower Ext Edema] : no extremity edema [Leg Claudication] : no intermittent leg claudication [Palpitations] : no palpitations [Syncope] : no syncope [FreeTextEntry5] : (+) Chest Pain

## 2024-04-12 NOTE — DISCUSSION/SUMMARY
[FreeTextEntry1] : Chest Pain: Due to exertional nature of symptoms, recommend a stress echocardiogram to evaluate for exercise-induced symptoms.   HTN: The impression is hypertension. Currently, the condition is controlled on Valsartan and HCTZ. There are no changes in medication management. Continue current medical therapy. Other planned treatments include an exercise regimen, weight loss, low sodium diet, and alcohol moderation.  Recommend a CAC score to risk stratify the patient. Lab requisition provided to check A1CG, CBC, CMP, Lipid Profile, Lipoprotein A, Apolipoprotein B, TSH, and UA.  Instructed to follow up after testing is complete.  Plan was discussed with the patient.   I, Dr. Mcfarlane, personally performed the evaluation and management services for this patient. That E&M includes reviewing prior history/tests, conducting the medically appropriate examination and evaluation, assessing all conditions, establishing a plan of care, ordering tests, and counselling and educating the patient. I spent a total of 45 minutes on today's encounter evaluating and treating the patient.

## 2024-04-19 ENCOUNTER — NON-APPOINTMENT (OUTPATIENT)
Age: 49
End: 2024-04-19

## 2024-04-30 ENCOUNTER — APPOINTMENT (OUTPATIENT)
Dept: ORTHOPEDIC SURGERY | Facility: CLINIC | Age: 49
End: 2024-04-30

## 2024-05-02 ENCOUNTER — NON-APPOINTMENT (OUTPATIENT)
Age: 49
End: 2024-05-02

## 2024-05-02 LAB
HCT VFR BLD CALC: 49.1 %
HGB BLD-MCNC: 16.7 G/DL
MCHC RBC-ENTMCNC: 28.9 PG
MCHC RBC-ENTMCNC: 34 G/DL
MCV RBC AUTO: 85.1 FL
PLATELET # BLD AUTO: 258 K/UL
PMV BLD AUTO: 0 /100 WBCS
PMV BLD: 9.8 FL
RBC # BLD: 5.77 M/UL
RBC # FLD: 13.3 %
WBC # FLD AUTO: 6.82 K/UL

## 2024-05-06 ENCOUNTER — NON-APPOINTMENT (OUTPATIENT)
Age: 49
End: 2024-05-06

## 2024-05-06 LAB
ALBUMIN SERPL ELPH-MCNC: 4.7 G/DL
ALP BLD-CCNC: 138 U/L
ALT SERPL-CCNC: 24 U/L
ANION GAP SERPL CALC-SCNC: 14 MMOL/L
APO B SERPL-MCNC: 117 MG/DL
APO LP(A) SERPL-MCNC: 24.8 NMOL/L
APPEARANCE: CLEAR
AST SERPL-CCNC: 20 U/L
BILIRUB SERPL-MCNC: 0.7 MG/DL
BILIRUBIN URINE: NEGATIVE
BLOOD URINE: NEGATIVE
BUN SERPL-MCNC: 13 MG/DL
CALCIUM SERPL-MCNC: 9.4 MG/DL
CHLORIDE SERPL-SCNC: 100 MMOL/L
CHOLEST SERPL-MCNC: 209 MG/DL
CO2 SERPL-SCNC: 26 MMOL/L
COLOR: YELLOW
CREAT SERPL-MCNC: 0.9 MG/DL
EGFR: 105 ML/MIN/1.73M2
ESTIMATED AVERAGE GLUCOSE: 100 MG/DL
GLUCOSE QUALITATIVE U: NEGATIVE MG/DL
GLUCOSE SERPL-MCNC: 99 MG/DL
HBA1C MFR BLD HPLC: 5.1 %
HDLC SERPL-MCNC: 44 MG/DL
KETONES URINE: NEGATIVE MG/DL
LDLC SERPL CALC-MCNC: 131 MG/DL
LEUKOCYTE ESTERASE URINE: NEGATIVE
NITRITE URINE: NEGATIVE
NONHDLC SERPL-MCNC: 165 MG/DL
PH URINE: 5.5
POTASSIUM SERPL-SCNC: 4.1 MMOL/L
PROT SERPL-MCNC: 7.2 G/DL
PROTEIN URINE: NEGATIVE MG/DL
SODIUM SERPL-SCNC: 140 MMOL/L
SPECIFIC GRAVITY URINE: 1.03
TRIGL SERPL-MCNC: 168 MG/DL
TSH SERPL-ACNC: 3.53 UIU/ML
UROBILINOGEN URINE: 0.2 MG/DL

## 2024-05-09 ENCOUNTER — APPOINTMENT (OUTPATIENT)
Dept: PULMONOLOGY | Facility: CLINIC | Age: 49
End: 2024-05-09
Payer: COMMERCIAL

## 2024-05-09 VITALS
BODY MASS INDEX: 35.26 KG/M2 | DIASTOLIC BLOOD PRESSURE: 78 MMHG | OXYGEN SATURATION: 99 % | SYSTOLIC BLOOD PRESSURE: 140 MMHG | WEIGHT: 260 LBS | HEART RATE: 84 BPM

## 2024-05-09 DIAGNOSIS — G47.26 CIRCADIAN RHYTHM SLEEP DISORDER, SHIFT WORK TYPE: ICD-10-CM

## 2024-05-09 DIAGNOSIS — G47.33 OBSTRUCTIVE SLEEP APNEA (ADULT) (PEDIATRIC): ICD-10-CM

## 2024-05-09 DIAGNOSIS — R91.8 OTHER NONSPECIFIC ABNORMAL FINDING OF LUNG FIELD: ICD-10-CM

## 2024-05-09 PROCEDURE — 99203 OFFICE O/P NEW LOW 30 MIN: CPT

## 2024-05-09 NOTE — HISTORY OF PRESENT ILLNESS
[Never] : never [TextBox_4] : Mr. YODER is a 49 year man with a medical history significant for hypertension presenting today to the clinic for evaluation for sleep apnea.  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 	( x ) Tiredness during the day? 	(  ) Observed overnight apnea? 	( x ) Pressure elevated? 	( x ) BMI > 35? 	(  ) Age > 50? 	(  ) Neck circumference >16in? 	( x ) Gender = male?   # Pulmonary Nodule 	First noted:	04/2024, 5mm L lung, JAVIER granuloma  Prior cancer?			denies Ever smoker?			denies Family Hx of Lung Ca?		Aunt w/ lung cancer [TextBox_29] : cigars occiasionaly

## 2024-05-16 ENCOUNTER — APPOINTMENT (OUTPATIENT)
Dept: CARDIOLOGY | Facility: CLINIC | Age: 49
End: 2024-05-16
Payer: COMMERCIAL

## 2024-05-16 DIAGNOSIS — I10 ESSENTIAL (PRIMARY) HYPERTENSION: ICD-10-CM

## 2024-05-16 DIAGNOSIS — E78.5 HYPERLIPIDEMIA, UNSPECIFIED: ICD-10-CM

## 2024-05-16 DIAGNOSIS — R07.9 CHEST PAIN, UNSPECIFIED: ICD-10-CM

## 2024-05-16 PROCEDURE — 93351 STRESS TTE COMPLETE: CPT

## 2024-05-16 PROCEDURE — 99214 OFFICE O/P EST MOD 30 MIN: CPT

## 2024-05-16 PROCEDURE — 93325 DOPPLER ECHO COLOR FLOW MAPG: CPT

## 2024-05-16 PROCEDURE — 93320 DOPPLER ECHO COMPLETE: CPT

## 2024-05-16 RX ORDER — VALSARTAN 80 MG/1
80 TABLET ORAL DAILY
Refills: 0 | Status: ACTIVE | COMMUNITY

## 2024-05-16 RX ORDER — HYDROCHLOROTHIAZIDE 25 MG/1
25 TABLET ORAL
Refills: 0 | Status: ACTIVE | COMMUNITY

## 2024-05-16 NOTE — REVIEW OF SYSTEMS
[SOB] : no shortness of breath [Dyspnea on exertion] : not dyspnea during exertion [Lower Ext Edema] : no extremity edema [Leg Claudication] : no intermittent leg claudication [Palpitations] : no palpitations [Syncope] : no syncope [Negative] : Heme/Lymph [FreeTextEntry5] : (+) Chest Pain

## 2024-05-16 NOTE — DISCUSSION/SUMMARY
[FreeTextEntry1] : Stress echo performed today revealed no evidence of exercise-induced myocardial ischemia at 85 % MPHR.   HTN: The impression is hypertension. Currently, the condition is controlled on Valsartan and HCTZ. There are no changes in medication management. Continue current medical therapy. Other planned treatments include an exercise regimen, weight loss, low sodium diet, and alcohol moderation.  Instructed to follow up in 6 months.  Plan was discussed with the patient.

## 2024-05-16 NOTE — HISTORY OF PRESENT ILLNESS
[FreeTextEntry1] : TAVO YODER is a 49-year-old male, with a PMHx significant for HTN, who presents today for stress echo. Last seen on 4/11/24 for evaluation of HTN associated with left arm pain. Patient reports he works as a  and noticed his BP has been elevated. States that when his BP is elevated, he develops left arm pain and numbness. Worse with exertion and relieved with rest. Currently on Diovan and HCTZ. Recent CAC score is 5.01 (51%). Otherwise: (-) SOB.   Social History: (-) Smoking, (+) EtOH, (-) Illicit drug use.

## 2024-08-17 ENCOUNTER — APPOINTMENT (OUTPATIENT)
Dept: SLEEP CENTER | Facility: HOSPITAL | Age: 49
End: 2024-08-17

## 2024-08-17 ENCOUNTER — OUTPATIENT (OUTPATIENT)
Dept: OUTPATIENT SERVICES | Facility: HOSPITAL | Age: 49
LOS: 1 days | Discharge: HOME | End: 2024-08-17
Payer: COMMERCIAL

## 2024-08-17 DIAGNOSIS — G47.33 OBSTRUCTIVE SLEEP APNEA (ADULT) (PEDIATRIC): ICD-10-CM

## 2024-08-17 DIAGNOSIS — Z98.890 OTHER SPECIFIED POSTPROCEDURAL STATES: Chronic | ICD-10-CM

## 2024-08-17 PROCEDURE — 95810 POLYSOM 6/> YRS 4/> PARAM: CPT | Mod: 26

## 2024-08-17 PROCEDURE — 95810 POLYSOM 6/> YRS 4/> PARAM: CPT

## 2024-08-20 DIAGNOSIS — G47.33 OBSTRUCTIVE SLEEP APNEA (ADULT) (PEDIATRIC): ICD-10-CM

## 2024-09-16 ENCOUNTER — APPOINTMENT (OUTPATIENT)
Dept: PULMONOLOGY | Facility: CLINIC | Age: 49
End: 2024-09-16